# Patient Record
Sex: FEMALE | Race: BLACK OR AFRICAN AMERICAN | NOT HISPANIC OR LATINO | URBAN - METROPOLITAN AREA
[De-identification: names, ages, dates, MRNs, and addresses within clinical notes are randomized per-mention and may not be internally consistent; named-entity substitution may affect disease eponyms.]

---

## 2020-07-25 ENCOUNTER — TELEPHONE ENCOUNTER (OUTPATIENT)
Dept: URBAN - METROPOLITAN AREA CLINIC 13 | Facility: CLINIC | Age: 75
End: 2020-07-25

## 2020-07-25 RX ORDER — POLYETHYLENE GLYCOL 3350, SODIUM CHLORIDE, SODIUM BICARBONATE AND POTASSIUM CHLORIDE WITH LEMON FLAVOR 420; 11.2; 5.72; 1.48 G/4L; G/4L; G/4L; G/4L
TAKE 1/2 GALLON AT 5:00 PM DAY BEFORE PROCEDURE, TAKE SECOND 1/2 OF GALLON 6 HRS PRIOR TO PROCEDURE POWDER, FOR SOLUTION ORAL
Qty: 1 | Refills: 0 | OUTPATIENT
Start: 2019-07-05 | End: 2019-08-19

## 2020-07-25 RX ORDER — VARENICLINE TARTRATE 1 MG/1
TAKE 1 TABLET TWICE DAILY TABLET, FILM COATED ORAL
Refills: 0 | OUTPATIENT
End: 2019-08-19

## 2020-07-25 RX ORDER — POLYETHYLENE GLYCOL 3350, SODIUM CHLORIDE, SODIUM BICARBONATE AND POTASSIUM CHLORIDE WITH LEMON FLAVOR 420; 11.2; 5.72; 1.48 G/4L; G/4L; G/4L; G/4L
TAKE 1/2 GALLON AT 5:00 PM DAY BEFORE PROCEDURE, TAKE SECOND 1/2 OF GALLON 6 HRS PRIOR TO PROCEDURE POWDER, FOR SOLUTION ORAL
Qty: 1 | Refills: 0 | OUTPATIENT
Start: 2018-03-12 | End: 2018-04-16

## 2020-07-26 ENCOUNTER — TELEPHONE ENCOUNTER (OUTPATIENT)
Dept: URBAN - METROPOLITAN AREA CLINIC 13 | Facility: CLINIC | Age: 75
End: 2020-07-26

## 2020-07-26 RX ORDER — AZELASTINE HYDROCHLORIDE 137 UG/1
USE 1 TO 2 SPRAYS IN EACH NOSTRIL TWICE DAILY AS NEEDED SPRAY, METERED NASAL
Refills: 0 | Status: ACTIVE | COMMUNITY
Start: 2019-02-28

## 2020-07-26 RX ORDER — MULTIVITAMIN/IRON/FOLIC ACID 18MG-0.4MG
TAKE 1 TABLET DAILY TABLET ORAL
Refills: 0 | Status: ACTIVE | COMMUNITY

## 2020-07-26 RX ORDER — IBUPROFEN 800 MG/1
TABLET ORAL
Qty: 60 | Refills: 0 | Status: ACTIVE | COMMUNITY
Start: 2018-10-11

## 2020-07-26 RX ORDER — OMEPRAZOLE 40 MG/1
TAKE 1 CAPSULE BY MOUTH  DAILY CAPSULE, DELAYED RELEASE ORAL
Refills: 3 | Status: ACTIVE | COMMUNITY

## 2020-07-26 RX ORDER — SIMVASTATIN 20 MG/1
TAKE 1 TABLET DAILY TABLET, FILM COATED ORAL
Refills: 0 | Status: ACTIVE | COMMUNITY

## 2020-07-26 RX ORDER — CYCLOSPORINE 0.5 MG/ML
INSTILL 1 DROP IN EACH EYE TWICE DAILY EMULSION OPHTHALMIC
Refills: 0 | Status: ACTIVE | COMMUNITY
Start: 2019-01-24

## 2020-07-26 RX ORDER — MECLIZINE HYDROCHLORIDE 25 MG/1
TABLET ORAL
Qty: 20 | Refills: 0 | Status: ACTIVE | COMMUNITY
Start: 2018-10-25

## 2020-07-26 RX ORDER — DILTIAZEM HYDROCHLORIDE 240 MG/1
TAKE 1 CAPSULE DAILY CAPSULE, COATED, EXTENDED RELEASE ORAL
Refills: 0 | Status: ACTIVE | COMMUNITY

## 2022-08-04 ENCOUNTER — TELEPHONE ENCOUNTER (OUTPATIENT)
Dept: URBAN - METROPOLITAN AREA CLINIC 113 | Facility: CLINIC | Age: 77
End: 2022-08-04

## 2022-08-04 RX ORDER — CYCLOSPORINE 0.5 MG/ML
INSTILL 1 DROP IN EACH EYE TWICE DAILY EMULSION OPHTHALMIC
Refills: 0 | Status: ACTIVE | COMMUNITY
Start: 2019-01-24

## 2022-08-04 RX ORDER — IBUPROFEN 800 MG/1
TABLET ORAL
Qty: 60 | Refills: 0 | Status: ACTIVE | COMMUNITY
Start: 2018-10-11

## 2022-08-04 RX ORDER — AZELASTINE HYDROCHLORIDE 137 UG/1
USE 1 TO 2 SPRAYS IN EACH NOSTRIL TWICE DAILY AS NEEDED SPRAY, METERED NASAL
Refills: 0 | Status: ACTIVE | COMMUNITY
Start: 2019-02-28

## 2022-08-04 RX ORDER — MULTIVITAMIN/IRON/FOLIC ACID 18MG-0.4MG
TAKE 1 TABLET DAILY TABLET ORAL
Refills: 0 | Status: ACTIVE | COMMUNITY

## 2022-08-04 RX ORDER — DILTIAZEM HYDROCHLORIDE 240 MG/1
TAKE 1 CAPSULE DAILY CAPSULE, COATED, EXTENDED RELEASE ORAL
Refills: 0 | Status: ACTIVE | COMMUNITY

## 2022-08-04 RX ORDER — POLYETHYLENE GLYCOL 3350, SODIUM CHLORIDE, SODIUM BICARBONATE, POTASSIUM CHLORIDE 420; 11.2; 5.72; 1.48 G/4L; G/4L; G/4L; G/4L
AS DIRECTED POWDER, FOR SOLUTION ORAL ONCE
Qty: 420 GM | Refills: 0 | OUTPATIENT
Start: 2022-08-05 | End: 2022-08-06

## 2022-08-04 RX ORDER — OMEPRAZOLE 40 MG/1
TAKE 1 CAPSULE BY MOUTH  DAILY CAPSULE, DELAYED RELEASE ORAL
Refills: 3 | Status: ACTIVE | COMMUNITY

## 2022-08-04 RX ORDER — SIMVASTATIN 20 MG/1
TAKE 1 TABLET DAILY TABLET, FILM COATED ORAL
Refills: 0 | Status: ACTIVE | COMMUNITY

## 2022-08-04 RX ORDER — MECLIZINE HYDROCHLORIDE 25 MG/1
TABLET ORAL
Qty: 20 | Refills: 0 | Status: ACTIVE | COMMUNITY
Start: 2018-10-25

## 2022-08-09 ENCOUNTER — TELEPHONE ENCOUNTER (OUTPATIENT)
Dept: URBAN - METROPOLITAN AREA CLINIC 113 | Facility: CLINIC | Age: 77
End: 2022-08-09

## 2022-08-12 PROBLEM — 428283002 HISTORY OF POLYP OF COLON: Status: ACTIVE | Noted: 2022-08-12

## 2022-08-29 ENCOUNTER — TELEPHONE ENCOUNTER (OUTPATIENT)
Dept: URBAN - METROPOLITAN AREA CLINIC 113 | Facility: CLINIC | Age: 77
End: 2022-08-29

## 2022-09-20 ENCOUNTER — OFFICE VISIT (OUTPATIENT)
Dept: URBAN - METROPOLITAN AREA SURGERY CENTER 25 | Facility: SURGERY CENTER | Age: 77
End: 2022-09-20

## 2022-11-02 ENCOUNTER — OFFICE VISIT (OUTPATIENT)
Dept: URBAN - METROPOLITAN AREA CLINIC 113 | Facility: CLINIC | Age: 77
End: 2022-11-02
Payer: MEDICARE

## 2022-11-02 ENCOUNTER — WEB ENCOUNTER (OUTPATIENT)
Dept: URBAN - METROPOLITAN AREA CLINIC 113 | Facility: CLINIC | Age: 77
End: 2022-11-02

## 2022-11-02 ENCOUNTER — DASHBOARD ENCOUNTERS (OUTPATIENT)
Age: 77
End: 2022-11-02

## 2022-11-02 VITALS
HEART RATE: 74 BPM | WEIGHT: 113 LBS | SYSTOLIC BLOOD PRESSURE: 160 MMHG | HEIGHT: 62 IN | BODY MASS INDEX: 20.8 KG/M2 | RESPIRATION RATE: 16 BRPM | TEMPERATURE: 97.3 F | DIASTOLIC BLOOD PRESSURE: 72 MMHG

## 2022-11-02 DIAGNOSIS — D12.6 TUBULOVILLOUS ADENOMA OF COLON: ICD-10-CM

## 2022-11-02 DIAGNOSIS — D3A.8: ICD-10-CM

## 2022-11-02 PROCEDURE — 99203 OFFICE O/P NEW LOW 30 MIN: CPT | Performed by: INTERNAL MEDICINE

## 2022-11-02 RX ORDER — CYCLOSPORINE 0.5 MG/ML
INSTILL 1 DROP IN EACH EYE TWICE DAILY EMULSION OPHTHALMIC
Refills: 0 | Status: ON HOLD | COMMUNITY
Start: 2019-01-24

## 2022-11-02 RX ORDER — SIMVASTATIN 20 MG/1
TAKE 1 TABLET DAILY TABLET, FILM COATED ORAL
Refills: 0 | Status: ON HOLD | COMMUNITY

## 2022-11-02 RX ORDER — OMEPRAZOLE 40 MG/1
TAKE 1 CAPSULE BY MOUTH  DAILY CAPSULE, DELAYED RELEASE ORAL
OUTPATIENT

## 2022-11-02 RX ORDER — MECLIZINE HYDROCHLORIDE 25 MG/1
TABLET ORAL
Qty: 20 | Refills: 0 | Status: ON HOLD | COMMUNITY
Start: 2018-10-25

## 2022-11-02 RX ORDER — IBUPROFEN 800 MG/1
TABLET ORAL
Qty: 60 | Refills: 0 | Status: ACTIVE | COMMUNITY
Start: 2018-10-11

## 2022-11-02 RX ORDER — CETIRIZINE HYDROCHLORIDE 10 MG/1
1 TABLET TABLET, FILM COATED ORAL ONCE A DAY
Status: ACTIVE | COMMUNITY

## 2022-11-02 RX ORDER — DILTIAZEM HYDROCHLORIDE 240 MG/1
TAKE 1 CAPSULE DAILY CAPSULE, COATED, EXTENDED RELEASE ORAL
Refills: 0 | Status: ON HOLD | COMMUNITY

## 2022-11-02 RX ORDER — DILTIAZEM HYDROCHLORIDE 240 MG/1
1 CAPSULE CAPSULE, EXTENDED RELEASE ORAL ONCE A DAY
Status: ACTIVE | COMMUNITY

## 2022-11-02 RX ORDER — OMEGA-3/DHA/EPA/FISH OIL 120-180 MG
AS DIRECTED CAPSULE ORAL
Status: ACTIVE | COMMUNITY

## 2022-11-02 RX ORDER — OMEPRAZOLE 40 MG/1
TAKE 1 CAPSULE BY MOUTH  DAILY CAPSULE, DELAYED RELEASE ORAL
Refills: 3 | Status: ACTIVE | COMMUNITY

## 2022-11-02 RX ORDER — AZELASTINE HYDROCHLORIDE 137 UG/1
USE 1 TO 2 SPRAYS IN EACH NOSTRIL TWICE DAILY AS NEEDED SPRAY, METERED NASAL
Refills: 0 | Status: ON HOLD | COMMUNITY
Start: 2019-02-28

## 2022-11-02 RX ORDER — MULTIVITAMIN/IRON/FOLIC ACID 18MG-0.4MG
TAKE 1 TABLET DAILY TABLET ORAL
Refills: 0 | Status: ON HOLD | COMMUNITY

## 2022-11-02 NOTE — HPI-TODAY'S VISIT:
Ms. Eduardo is a 77-year-old male with a history of neuroendocrine tumor here for routine follow-up.  SHe denies abdominal pain, nausea, vomiting, change in bowel habits or blood in the stool.  She is a  now and has lost around 20lbs.  She has to cook for herself now which is difficult.  No family history of colon cancer, inflammatory bowel disease GI cancers, peptic ulcer disease or chronic liver disease.  No significant alcohol use, smoking or illicit drug use. Initial screening colonoscopy April 2018 revealed a 13 mm rectosigmoid sessile polyp which was removed with piecemeal hot snare, 11 mm rectal polyp removed with hot snare, few benign-appearing rectal polyps, few sigmoid diverticula, otherwise normal colon and small internal hemorrhoids.  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  Follow-up flexible sigmoidoscopy July 2018 revealed a tattoo in the rectosigmoid and a polypoid lesion in the rectosigmoid that was removed with hot snare.  Pathology revealed well-differentiated neuroendocrine tumor, intermediate grade.  Surveillance colonoscopy August 2019 revealed a tattoo in the rectosigmoid and a post polypectomy scar with no evidence of residual polyp tissue, few benign appearing rectosigmoid polyps, many sigmoid diverticula, otherwise normal colon and medium size internal hemorrhoids.  The polyps were hyperplastic.  It was recommended repeat colonoscopy in 3 years.

## 2022-11-03 PROBLEM — 130521000119106: Status: ACTIVE | Noted: 2022-11-02

## 2022-11-03 PROBLEM — 428054006: Status: ACTIVE | Noted: 2022-11-02

## 2022-12-08 ENCOUNTER — CLAIMS CREATED FROM THE CLAIM WINDOW (OUTPATIENT)
Dept: URBAN - METROPOLITAN AREA SURGERY CENTER 25 | Facility: SURGERY CENTER | Age: 77
End: 2022-12-08
Payer: MEDICARE

## 2022-12-08 ENCOUNTER — CLAIMS CREATED FROM THE CLAIM WINDOW (OUTPATIENT)
Dept: URBAN - METROPOLITAN AREA SURGERY CENTER 25 | Facility: SURGERY CENTER | Age: 77
End: 2022-12-08

## 2022-12-08 ENCOUNTER — CLAIMS CREATED FROM THE CLAIM WINDOW (OUTPATIENT)
Dept: URBAN - METROPOLITAN AREA CLINIC 4 | Facility: CLINIC | Age: 77
End: 2022-12-08
Payer: MEDICARE

## 2022-12-08 DIAGNOSIS — Z86.010 ADENOMAS PERSONAL HISTORY OF COLONIC POLYPS: ICD-10-CM

## 2022-12-08 DIAGNOSIS — K63.89 OTHER SPECIFIED DISEASES OF INTESTINE: ICD-10-CM

## 2022-12-08 DIAGNOSIS — Z87.19 PERSONAL HISTORY OF OTHER DISEASES OF THE DIGESTIVE SYSTEM: ICD-10-CM

## 2022-12-08 PROCEDURE — G8907 PT DOC NO EVENTS ON DISCHARG: HCPCS | Performed by: INTERNAL MEDICINE

## 2022-12-08 PROCEDURE — 88305 TISSUE EXAM BY PATHOLOGIST: CPT | Performed by: PATHOLOGY

## 2022-12-08 PROCEDURE — 45385 COLONOSCOPY W/LESION REMOVAL: CPT | Performed by: INTERNAL MEDICINE

## 2022-12-08 RX ORDER — DILTIAZEM HYDROCHLORIDE 240 MG/1
TAKE 1 CAPSULE DAILY CAPSULE, COATED, EXTENDED RELEASE ORAL
Refills: 0 | Status: ON HOLD | COMMUNITY

## 2022-12-08 RX ORDER — CYCLOSPORINE 0.5 MG/ML
INSTILL 1 DROP IN EACH EYE TWICE DAILY EMULSION OPHTHALMIC
Refills: 0 | Status: ON HOLD | COMMUNITY
Start: 2019-01-24

## 2022-12-08 RX ORDER — OMEGA-3/DHA/EPA/FISH OIL 120-180 MG
AS DIRECTED CAPSULE ORAL
Status: ACTIVE | COMMUNITY

## 2022-12-08 RX ORDER — IBUPROFEN 800 MG/1
TABLET ORAL
Qty: 60 | Refills: 0 | Status: ACTIVE | COMMUNITY
Start: 2018-10-11

## 2022-12-08 RX ORDER — OMEPRAZOLE 40 MG/1
TAKE 1 CAPSULE BY MOUTH  DAILY CAPSULE, DELAYED RELEASE ORAL
Status: ACTIVE | COMMUNITY

## 2022-12-08 RX ORDER — CETIRIZINE HYDROCHLORIDE 10 MG/1
1 TABLET TABLET, FILM COATED ORAL ONCE A DAY
Status: ACTIVE | COMMUNITY

## 2022-12-08 RX ORDER — DILTIAZEM HYDROCHLORIDE 240 MG/1
1 CAPSULE CAPSULE, EXTENDED RELEASE ORAL ONCE A DAY
Status: ACTIVE | COMMUNITY

## 2022-12-08 RX ORDER — SIMVASTATIN 20 MG/1
TAKE 1 TABLET DAILY TABLET, FILM COATED ORAL
Refills: 0 | Status: ON HOLD | COMMUNITY

## 2022-12-08 RX ORDER — AZELASTINE HYDROCHLORIDE 137 UG/1
USE 1 TO 2 SPRAYS IN EACH NOSTRIL TWICE DAILY AS NEEDED SPRAY, METERED NASAL
Refills: 0 | Status: ON HOLD | COMMUNITY
Start: 2019-02-28

## 2022-12-08 RX ORDER — MULTIVITAMIN/IRON/FOLIC ACID 18MG-0.4MG
TAKE 1 TABLET DAILY TABLET ORAL
Refills: 0 | Status: ON HOLD | COMMUNITY

## 2022-12-08 RX ORDER — MECLIZINE HYDROCHLORIDE 25 MG/1
TABLET ORAL
Qty: 20 | Refills: 0 | Status: ON HOLD | COMMUNITY
Start: 2018-10-25

## 2022-12-15 NOTE — PHYSICAL EXAM CHEST:
Authorization received    Form received in [facility] for processing.  Please note that it takes 7-10 business days for completion.   chest wall non-tender, breathing is unlabored without accessory muscle use, normal breath sounds

## 2023-01-06 PROBLEM — 428283002 HISTORY OF POLYP OF COLON: Status: ACTIVE | Noted: 2022-12-15
